# Patient Record
Sex: FEMALE | Race: WHITE | Employment: UNEMPLOYED | URBAN - METROPOLITAN AREA
[De-identification: names, ages, dates, MRNs, and addresses within clinical notes are randomized per-mention and may not be internally consistent; named-entity substitution may affect disease eponyms.]

---

## 2017-09-06 PROBLEM — M25.512 CHRONIC LEFT SHOULDER PAIN: Status: ACTIVE | Noted: 2017-09-06

## 2017-09-06 PROBLEM — F43.20 ADULT SITUATIONAL STRESS DISORDER: Status: ACTIVE | Noted: 2017-09-06

## 2017-09-06 PROBLEM — G89.29 CHRONIC LEFT SHOULDER PAIN: Status: ACTIVE | Noted: 2017-09-06

## 2018-04-18 ENCOUNTER — HOSPITAL ENCOUNTER (OUTPATIENT)
Dept: SURGERY | Age: 43
Discharge: HOME | End: 2018-04-18
Payer: OTHER GOVERNMENT

## 2018-04-18 VITALS
RESPIRATION RATE: 16 BRPM | OXYGEN SATURATION: 100 % | DIASTOLIC BLOOD PRESSURE: 58 MMHG | BODY MASS INDEX: 17.89 KG/M2 | SYSTOLIC BLOOD PRESSURE: 98 MMHG | HEART RATE: 77 BPM | TEMPERATURE: 97.9 F | WEIGHT: 101 LBS | HEIGHT: 63 IN

## 2018-04-18 LAB
ANION GAP SERPL CALC-SCNC: 5 MMOL/L (ref 7–16)
APPEARANCE UR: ABNORMAL
BACTERIA SPEC CULT: NORMAL
BACTERIA URNS QL MICRO: ABNORMAL /HPF
BASOPHILS # BLD: 0 K/UL (ref 0–0.2)
BASOPHILS NFR BLD: 0 % (ref 0–2)
BILIRUB UR QL: NEGATIVE
BUN SERPL-MCNC: 9 MG/DL (ref 6–23)
CALCIUM SERPL-MCNC: 8.5 MG/DL (ref 8.3–10.4)
CASTS URNS QL MICRO: ABNORMAL /LPF
CHLORIDE SERPL-SCNC: 108 MMOL/L (ref 98–107)
CO2 SERPL-SCNC: 29 MMOL/L (ref 21–32)
COLOR UR: YELLOW
CREAT SERPL-MCNC: 0.69 MG/DL (ref 0.6–1)
DIFFERENTIAL METHOD BLD: ABNORMAL
EOSINOPHIL # BLD: 0.1 K/UL (ref 0–0.8)
EOSINOPHIL NFR BLD: 1 % (ref 0.5–7.8)
EPI CELLS #/AREA URNS HPF: ABNORMAL /HPF
ERYTHROCYTE [DISTWIDTH] IN BLOOD BY AUTOMATED COUNT: 12.7 % (ref 11.9–14.6)
GLUCOSE SERPL-MCNC: 74 MG/DL (ref 65–100)
GLUCOSE UR STRIP.AUTO-MCNC: NEGATIVE MG/DL
HCT VFR BLD AUTO: 42.4 % (ref 35.8–46.3)
HGB BLD-MCNC: 14.6 G/DL (ref 11.7–15.4)
HGB UR QL STRIP: ABNORMAL
IMM GRANULOCYTES # BLD: 0 K/UL (ref 0–0.5)
IMM GRANULOCYTES NFR BLD AUTO: 0 % (ref 0–5)
KETONES UR QL STRIP.AUTO: NEGATIVE MG/DL
LEUKOCYTE ESTERASE UR QL STRIP.AUTO: NEGATIVE
LYMPHOCYTES # BLD: 1.4 K/UL (ref 0.5–4.6)
LYMPHOCYTES NFR BLD: 22 % (ref 13–44)
MCH RBC QN AUTO: 33.8 PG (ref 26.1–32.9)
MCHC RBC AUTO-ENTMCNC: 34.4 G/DL (ref 31.4–35)
MCV RBC AUTO: 98.1 FL (ref 79.6–97.8)
MONOCYTES # BLD: 0.4 K/UL (ref 0.1–1.3)
MONOCYTES NFR BLD: 6 % (ref 4–12)
NEUTS SEG # BLD: 4.7 K/UL (ref 1.7–8.2)
NEUTS SEG NFR BLD: 71 % (ref 43–78)
NITRITE UR QL STRIP.AUTO: NEGATIVE
PH UR STRIP: 6.5 [PH] (ref 5–9)
PLATELET # BLD AUTO: 236 K/UL (ref 150–450)
PMV BLD AUTO: 9.5 FL (ref 10.8–14.1)
POTASSIUM SERPL-SCNC: 4 MMOL/L (ref 3.5–5.1)
PROT UR STRIP-MCNC: NEGATIVE MG/DL
RBC # BLD AUTO: 4.32 M/UL (ref 4.05–5.25)
RBC #/AREA URNS HPF: ABNORMAL /HPF
SERVICE CMNT-IMP: NORMAL
SODIUM SERPL-SCNC: 142 MMOL/L (ref 136–145)
SP GR UR REFRACTOMETRY: 1.02 (ref 1–1.02)
UROBILINOGEN UR QL STRIP.AUTO: 0.2 EU/DL (ref 0.2–1)
WBC # BLD AUTO: 6.6 K/UL (ref 4.3–11.1)
WBC URNS QL MICRO: ABNORMAL /HPF

## 2018-04-18 PROCEDURE — 87641 MR-STAPH DNA AMP PROBE: CPT | Performed by: NEUROLOGICAL SURGERY

## 2018-04-18 PROCEDURE — 81001 URINALYSIS AUTO W/SCOPE: CPT | Performed by: NEUROLOGICAL SURGERY

## 2018-04-18 PROCEDURE — 85025 COMPLETE CBC W/AUTO DIFF WBC: CPT | Performed by: NEUROLOGICAL SURGERY

## 2018-04-18 PROCEDURE — 80048 BASIC METABOLIC PNL TOTAL CA: CPT | Performed by: NEUROLOGICAL SURGERY

## 2018-04-18 RX ORDER — BISMUTH SUBSALICYLATE 262 MG
1 TABLET,CHEWABLE ORAL DAILY
COMMUNITY
End: 2018-07-23

## 2018-04-18 RX ORDER — HYDROCORTISONE AND ACETIC ACID 20.75; 10.375 MG/ML; MG/ML
SOLUTION AURICULAR (OTIC) 2 TIMES DAILY
COMMUNITY

## 2018-04-18 RX ORDER — MAGNESIUM 30 MG
TABLET ORAL
COMMUNITY
End: 2018-07-23

## 2018-04-18 NOTE — PERIOP NOTES
Patient verified name, , and surgery as listed in [facility]. Patient provided medical/health information and PTA medications to the best of their ability. TYPE  CASE: 3  Orders per surgeon: yes received and dated yes  Labs per surgeon: mrsa swab,cbc,bmp,urine. Results: -  Labs per anesthesia protocol: type and screen-dos. Results =  EKG:  none     Nasal Swab collected per MD order and instructions for Mupirocin nasal ointment if required. Patient provided with and instructed on education handouts including Guide to Surgery, blood transfusions, pain management, and hand hygiene for the family and community, and [facility] brochure. Road to Recovery Spine surgery patient guide given. Instructed on incentive spirometry with return demonstration. Long handled prehab sponge given with instructions for use. Patient viewed spine prehab video. Hibiclens and instructions given per hospital policy. Instructed patient to continue previous medications as prescribed prior to surgery unless otherwise directed and to take the following medications the day of surgery according to anesthesia guidelines : hydrocodone as needed . Instructed patient to hold  the following medications on the day of surgery: multivitamin,magnesium,iodine. Original medication prescription bottles most visualized during patient appointment. Patient teach back successful and patient demonstrates knowledge of instruction.

## 2018-04-24 ENCOUNTER — ANESTHESIA EVENT (OUTPATIENT)
Dept: SURGERY | Age: 43
End: 2018-04-24
Payer: OTHER GOVERNMENT

## 2018-04-25 ENCOUNTER — ANESTHESIA (OUTPATIENT)
Dept: SURGERY | Age: 43
End: 2018-04-25
Payer: OTHER GOVERNMENT

## 2018-04-25 ENCOUNTER — HOSPITAL ENCOUNTER (OUTPATIENT)
Age: 43
Setting detail: OUTPATIENT SURGERY
Discharge: HOME | End: 2018-04-25
Attending: NEUROLOGICAL SURGERY | Admitting: NEUROLOGICAL SURGERY
Payer: OTHER GOVERNMENT

## 2018-04-25 ENCOUNTER — APPOINTMENT (OUTPATIENT)
Dept: GENERAL RADIOLOGY | Age: 43
End: 2018-04-25
Attending: NEUROLOGICAL SURGERY
Payer: OTHER GOVERNMENT

## 2018-04-25 VITALS
HEIGHT: 62 IN | SYSTOLIC BLOOD PRESSURE: 110 MMHG | HEART RATE: 71 BPM | TEMPERATURE: 98.5 F | WEIGHT: 99.5 LBS | OXYGEN SATURATION: 100 % | DIASTOLIC BLOOD PRESSURE: 60 MMHG | BODY MASS INDEX: 18.31 KG/M2 | RESPIRATION RATE: 20 BRPM

## 2018-04-25 DIAGNOSIS — M48.02 CERVICAL STENOSIS OF SPINAL CANAL: Primary | ICD-10-CM

## 2018-04-25 PROCEDURE — C1713 ANCHOR/SCREW BN/BN,TIS/BN: HCPCS | Performed by: NEUROLOGICAL SURGERY

## 2018-04-25 PROCEDURE — 74011250636 HC RX REV CODE- 250/636: Performed by: NEUROLOGICAL SURGERY

## 2018-04-25 PROCEDURE — 74011250637 HC RX REV CODE- 250/637: Performed by: ANESTHESIOLOGY

## 2018-04-25 PROCEDURE — 77030039266 HC ADH SKN EXOFIN S2SG -A: Performed by: NEUROLOGICAL SURGERY

## 2018-04-25 PROCEDURE — 77030028270 HC SRGFL HEMSTAT MTRX J&J -C: Performed by: NEUROLOGICAL SURGERY

## 2018-04-25 PROCEDURE — 76210000006 HC OR PH I REC 0.5 TO 1 HR: Performed by: NEUROLOGICAL SURGERY

## 2018-04-25 PROCEDURE — 74011000250 HC RX REV CODE- 250

## 2018-04-25 PROCEDURE — 77030030163 HC BN WAX J&J -A: Performed by: NEUROLOGICAL SURGERY

## 2018-04-25 PROCEDURE — 77030011267 HC ELECTRD BLD COVD -A: Performed by: NEUROLOGICAL SURGERY

## 2018-04-25 PROCEDURE — 77030003666 HC NDL SPINAL BD -A: Performed by: NEUROLOGICAL SURGERY

## 2018-04-25 PROCEDURE — 77030004391 HC BUR FLUT MEDT -C: Performed by: NEUROLOGICAL SURGERY

## 2018-04-25 PROCEDURE — 76060000035 HC ANESTHESIA 2 TO 2.5 HR: Performed by: NEUROLOGICAL SURGERY

## 2018-04-25 PROCEDURE — 77030026405 HC CGE ANT CERV ROIC ZIMM -H: Performed by: NEUROLOGICAL SURGERY

## 2018-04-25 PROCEDURE — 76010000171 HC OR TIME 2 TO 2.5 HR INTENSV-TIER 1: Performed by: NEUROLOGICAL SURGERY

## 2018-04-25 PROCEDURE — 72040 X-RAY EXAM NECK SPINE 2-3 VW: CPT

## 2018-04-25 PROCEDURE — 88304 TISSUE EXAM BY PATHOLOGIST: CPT | Performed by: NEUROLOGICAL SURGERY

## 2018-04-25 PROCEDURE — 77030009868 HC PIN DISTR CASPR AESC -B: Performed by: NEUROLOGICAL SURGERY

## 2018-04-25 PROCEDURE — 76210000020 HC REC RM PH II FIRST 0.5 HR: Performed by: NEUROLOGICAL SURGERY

## 2018-04-25 PROCEDURE — 77030008703 HC TU ET UNCUF COVD -A: Performed by: ANESTHESIOLOGY

## 2018-04-25 PROCEDURE — 77030018390 HC SPNG HEMSTAT2 J&J -B: Performed by: NEUROLOGICAL SURGERY

## 2018-04-25 PROCEDURE — 77030008477 HC STYL SATN SLP COVD -A: Performed by: ANESTHESIOLOGY

## 2018-04-25 PROCEDURE — 77030019908 HC STETH ESOPH SIMS -A: Performed by: ANESTHESIOLOGY

## 2018-04-25 PROCEDURE — 77030014650 HC SEAL MTRX FLOSEL BAXT -C: Performed by: NEUROLOGICAL SURGERY

## 2018-04-25 PROCEDURE — 74011250636 HC RX REV CODE- 250/636

## 2018-04-25 PROCEDURE — 77030003029 HC SUT VCRL J&J -B: Performed by: NEUROLOGICAL SURGERY

## 2018-04-25 PROCEDURE — 77030020782 HC GWN BAIR PAWS FLX 3M -B: Performed by: ANESTHESIOLOGY

## 2018-04-25 PROCEDURE — 74011000250 HC RX REV CODE- 250: Performed by: NEUROLOGICAL SURGERY

## 2018-04-25 PROCEDURE — 74011250636 HC RX REV CODE- 250/636: Performed by: ANESTHESIOLOGY

## 2018-04-25 PROCEDURE — 77030011640 HC PAD GRND REM COVD -A: Performed by: NEUROLOGICAL SURGERY

## 2018-04-25 PROCEDURE — 77030018836 HC SOL IRR NACL ICUM -A: Performed by: NEUROLOGICAL SURGERY

## 2018-04-25 PROCEDURE — 77030032490 HC SLV COMPR SCD KNE COVD -B: Performed by: NEUROLOGICAL SURGERY

## 2018-04-25 DEVICE — IMPLANTABLE DEVICE: Type: IMPLANTABLE DEVICE | Site: SPINE CERVICAL | Status: FUNCTIONAL

## 2018-04-25 DEVICE — PLATE SPNL H5-7MM 6DEG STD PEEK TI ANAT CRV FOR DISCECTOMY: Type: IMPLANTABLE DEVICE | Site: SPINE CERVICAL | Status: FUNCTIONAL

## 2018-04-25 DEVICE — GRAFT BNE SUB 25CC 2MM GRAN ALLOGENIC MORPHOGENETIC PROT W: Type: IMPLANTABLE DEVICE | Site: SPINE CERVICAL | Status: FUNCTIONAL

## 2018-04-25 RX ORDER — HYDROMORPHONE HYDROCHLORIDE 2 MG/ML
0.5 INJECTION, SOLUTION INTRAMUSCULAR; INTRAVENOUS; SUBCUTANEOUS
Status: DISCONTINUED | OUTPATIENT
Start: 2018-04-25 | End: 2018-04-25 | Stop reason: HOSPADM

## 2018-04-25 RX ORDER — LIDOCAINE HYDROCHLORIDE AND EPINEPHRINE 10; 10 MG/ML; UG/ML
INJECTION, SOLUTION INFILTRATION; PERINEURAL AS NEEDED
Status: DISCONTINUED | OUTPATIENT
Start: 2018-04-25 | End: 2018-04-25 | Stop reason: HOSPADM

## 2018-04-25 RX ORDER — DEXAMETHASONE SODIUM PHOSPHATE 100 MG/10ML
10 INJECTION INTRAMUSCULAR; INTRAVENOUS ONCE
Status: COMPLETED | OUTPATIENT
Start: 2018-04-25 | End: 2018-04-25

## 2018-04-25 RX ORDER — FAMOTIDINE 20 MG/1
20 TABLET, FILM COATED ORAL ONCE
Status: COMPLETED | OUTPATIENT
Start: 2018-04-25 | End: 2018-04-25

## 2018-04-25 RX ORDER — OXYCODONE HYDROCHLORIDE 5 MG/1
5 TABLET ORAL
Status: COMPLETED | OUTPATIENT
Start: 2018-04-25 | End: 2018-04-25

## 2018-04-25 RX ORDER — SODIUM CHLORIDE 0.9 % (FLUSH) 0.9 %
5-10 SYRINGE (ML) INJECTION AS NEEDED
Status: DISCONTINUED | OUTPATIENT
Start: 2018-04-25 | End: 2018-04-25 | Stop reason: HOSPADM

## 2018-04-25 RX ORDER — FENTANYL CITRATE 50 UG/ML
100 INJECTION, SOLUTION INTRAMUSCULAR; INTRAVENOUS ONCE
Status: DISCONTINUED | OUTPATIENT
Start: 2018-04-25 | End: 2018-04-25 | Stop reason: HOSPADM

## 2018-04-25 RX ORDER — ACETAMINOPHEN 500 MG
1000 TABLET ORAL
Status: DISCONTINUED | OUTPATIENT
Start: 2018-04-25 | End: 2018-04-25 | Stop reason: HOSPADM

## 2018-04-25 RX ORDER — SODIUM CHLORIDE, SODIUM LACTATE, POTASSIUM CHLORIDE, CALCIUM CHLORIDE 600; 310; 30; 20 MG/100ML; MG/100ML; MG/100ML; MG/100ML
150 INJECTION, SOLUTION INTRAVENOUS CONTINUOUS
Status: DISCONTINUED | OUTPATIENT
Start: 2018-04-25 | End: 2018-04-25 | Stop reason: HOSPADM

## 2018-04-25 RX ORDER — ONDANSETRON 2 MG/ML
INJECTION INTRAMUSCULAR; INTRAVENOUS AS NEEDED
Status: DISCONTINUED | OUTPATIENT
Start: 2018-04-25 | End: 2018-04-25 | Stop reason: HOSPADM

## 2018-04-25 RX ORDER — OXYCODONE AND ACETAMINOPHEN 7.5; 325 MG/1; MG/1
1 TABLET ORAL
Qty: 21 TAB | Refills: 0 | Status: SHIPPED | OUTPATIENT
Start: 2018-04-25 | End: 2018-06-26

## 2018-04-25 RX ORDER — ROCURONIUM BROMIDE 10 MG/ML
INJECTION, SOLUTION INTRAVENOUS AS NEEDED
Status: DISCONTINUED | OUTPATIENT
Start: 2018-04-25 | End: 2018-04-25 | Stop reason: HOSPADM

## 2018-04-25 RX ORDER — PROPOFOL 10 MG/ML
INJECTION, EMULSION INTRAVENOUS AS NEEDED
Status: DISCONTINUED | OUTPATIENT
Start: 2018-04-25 | End: 2018-04-25 | Stop reason: HOSPADM

## 2018-04-25 RX ORDER — SODIUM CHLORIDE 9 MG/ML
50 INJECTION, SOLUTION INTRAVENOUS CONTINUOUS
Status: DISCONTINUED | OUTPATIENT
Start: 2018-04-25 | End: 2018-04-25 | Stop reason: HOSPADM

## 2018-04-25 RX ORDER — FENTANYL CITRATE 50 UG/ML
INJECTION, SOLUTION INTRAMUSCULAR; INTRAVENOUS AS NEEDED
Status: DISCONTINUED | OUTPATIENT
Start: 2018-04-25 | End: 2018-04-25 | Stop reason: HOSPADM

## 2018-04-25 RX ORDER — NEOSTIGMINE METHYLSULFATE 1 MG/ML
INJECTION INTRAVENOUS AS NEEDED
Status: DISCONTINUED | OUTPATIENT
Start: 2018-04-25 | End: 2018-04-25 | Stop reason: HOSPADM

## 2018-04-25 RX ORDER — CEFAZOLIN SODIUM/WATER 2 G/20 ML
2 SYRINGE (ML) INTRAVENOUS
Status: COMPLETED | OUTPATIENT
Start: 2018-04-25 | End: 2018-04-25

## 2018-04-25 RX ORDER — HYDROCODONE BITARTRATE AND ACETAMINOPHEN 5; 325 MG/1; MG/1
1 TABLET ORAL AS NEEDED
Status: DISCONTINUED | OUTPATIENT
Start: 2018-04-25 | End: 2018-04-25 | Stop reason: HOSPADM

## 2018-04-25 RX ORDER — LIDOCAINE HYDROCHLORIDE 10 MG/ML
0.1 INJECTION INFILTRATION; PERINEURAL AS NEEDED
Status: DISCONTINUED | OUTPATIENT
Start: 2018-04-25 | End: 2018-04-25 | Stop reason: HOSPADM

## 2018-04-25 RX ORDER — SODIUM CHLORIDE 0.9 % (FLUSH) 0.9 %
5-10 SYRINGE (ML) INJECTION EVERY 8 HOURS
Status: DISCONTINUED | OUTPATIENT
Start: 2018-04-25 | End: 2018-04-25 | Stop reason: HOSPADM

## 2018-04-25 RX ORDER — LIDOCAINE HYDROCHLORIDE 20 MG/ML
INJECTION, SOLUTION EPIDURAL; INFILTRATION; INTRACAUDAL; PERINEURAL AS NEEDED
Status: DISCONTINUED | OUTPATIENT
Start: 2018-04-25 | End: 2018-04-25 | Stop reason: HOSPADM

## 2018-04-25 RX ORDER — GLYCOPYRROLATE 0.2 MG/ML
INJECTION INTRAMUSCULAR; INTRAVENOUS AS NEEDED
Status: DISCONTINUED | OUTPATIENT
Start: 2018-04-25 | End: 2018-04-25 | Stop reason: HOSPADM

## 2018-04-25 RX ORDER — MIDAZOLAM HYDROCHLORIDE 1 MG/ML
2 INJECTION, SOLUTION INTRAMUSCULAR; INTRAVENOUS
Status: DISCONTINUED | OUTPATIENT
Start: 2018-04-25 | End: 2018-04-25 | Stop reason: HOSPADM

## 2018-04-25 RX ADMIN — ROCURONIUM BROMIDE 5 MG: 10 INJECTION, SOLUTION INTRAVENOUS at 09:16

## 2018-04-25 RX ADMIN — HYDROMORPHONE HYDROCHLORIDE 0.5 MG: 2 INJECTION, SOLUTION INTRAMUSCULAR; INTRAVENOUS; SUBCUTANEOUS at 10:17

## 2018-04-25 RX ADMIN — LIDOCAINE HYDROCHLORIDE 100 MG: 20 INJECTION, SOLUTION EPIDURAL; INFILTRATION; INTRACAUDAL; PERINEURAL at 07:30

## 2018-04-25 RX ADMIN — FENTANYL CITRATE 100 MCG: 50 INJECTION, SOLUTION INTRAMUSCULAR; INTRAVENOUS at 07:30

## 2018-04-25 RX ADMIN — OXYCODONE HYDROCHLORIDE 5 MG: 5 TABLET ORAL at 10:21

## 2018-04-25 RX ADMIN — DEXAMETHASONE SODIUM PHOSPHATE 10 MG: 10 INJECTION INTRAMUSCULAR; INTRAVENOUS at 07:01

## 2018-04-25 RX ADMIN — SODIUM CHLORIDE, SODIUM LACTATE, POTASSIUM CHLORIDE, AND CALCIUM CHLORIDE 150 ML/HR: 600; 310; 30; 20 INJECTION, SOLUTION INTRAVENOUS at 06:50

## 2018-04-25 RX ADMIN — PROPOFOL 160 MG: 10 INJECTION, EMULSION INTRAVENOUS at 07:30

## 2018-04-25 RX ADMIN — ROCURONIUM BROMIDE 30 MG: 10 INJECTION, SOLUTION INTRAVENOUS at 07:30

## 2018-04-25 RX ADMIN — ROCURONIUM BROMIDE 10 MG: 10 INJECTION, SOLUTION INTRAVENOUS at 08:18

## 2018-04-25 RX ADMIN — NEOSTIGMINE METHYLSULFATE 2 MG: 1 INJECTION INTRAVENOUS at 09:36

## 2018-04-25 RX ADMIN — MIDAZOLAM HYDROCHLORIDE 2 MG: 1 INJECTION, SOLUTION INTRAMUSCULAR; INTRAVENOUS at 07:06

## 2018-04-25 RX ADMIN — FAMOTIDINE 20 MG: 20 TABLET ORAL at 07:00

## 2018-04-25 RX ADMIN — ONDANSETRON 4 MG: 2 INJECTION INTRAMUSCULAR; INTRAVENOUS at 09:17

## 2018-04-25 RX ADMIN — Medication 2 G: at 07:44

## 2018-04-25 RX ADMIN — GLYCOPYRROLATE 0.4 MG: 0.2 INJECTION INTRAMUSCULAR; INTRAVENOUS at 09:36

## 2018-04-25 NOTE — OP NOTES
[facility] REPORT    Sravani Singh  MR#: [id number]  : 1975  ACCOUNT #: [de-identified]   DATE OF SERVICE: 2018    PREOPERATIVE DIAGNOSIS:  Cervical stenosis with cord compression C5 through C7. POSTOPERATIVE DIAGNOSIS:  Cervical stenosis with cord compression C5 through C7. PROCEDURES PERFORMED:   1. Anterior cervical spine total diskectomy with decompression of the spinal cord and nerve roots, C5-C6 and C6-C7.  2.  Anterior cervical spine interbody fusion with LDR NIKKI cage OsteoAMP and bone marrow aspirate, C5-6 and C6-7.  3.  Anterior cervical spine instrumentation with LDR plating, C5 through C7.  4.  Bone marrow aspiration C6 vertebra. 5.  Continuous intraoperative fluoroscopy. SURGEON:  Roxy Carson MD     ASSISTANT:  None. ANESTHESIA:  General endotracheal.    ESTIMATED BLOOD LOSS:  Minimal.    PREPARATION:  ChloraPrep. COMPLICATIONS:  None. SPECIMENS REMOVED: C5-C6 and C6-C7 disk. IMPLANTS:  See below. HISTORY OF PRESENT ILLNESS:  A 42-year-old lady with significant upper extremity pain and weakness, refractory to conservative measures. MRI scan was positive for spinal stenosis at C5-6 and C6-7 with cord compression. The patient was admitted for surgery as conservative measures have failed. OPERATIVE NOTE:  The patient was brought to the operating room and was carefully placed under general endotracheal anesthesia without complication, was placed supine with a roll under the shoulder blades, the head gently extended on a Rodney pillow and the right side of the neck carefully prepped in usual sterile fashion. A linear incision was drawn out at the C6 vertebral level, confirmed by fluoroscopy. This was infiltrated with 1% Xylocaine with epinephrine, incised with a 15 blade and carried sharply through the platysma.   Using rostral to caudal subplatysmal dissection, a plane was created between the carotid artery laterally and trachea and esophagus medially. Handheld retractors were placed. The longus colli muscles were stripped bilaterally. Lateral C-arm fluoroscopy confirmed. An 18-gauge spinal needle was correctly inserted into the C5-6 disk space. The anterior spurs were removed with a Leksell rongeur at both C5-6 and C6-7. Both disk spaces were opened with a #15 blade and cleaned with pituitary rongeurs. The [place] distracting pin was placed in the C6 vertebra and using a 3 mL syringe and 16-gauge Angiocath, approximately 3 mL of bone marrow were aspirated and mixed with OsteoAMP on the back table. The hole was plugged with Nu-Knit, Surgicel and bone wax. Next, the [employer] Fredi AMA drill was used and the endplates at B7-7 and G0-1 were drilled sequentially down to the posterior ligament, which was opened with a sharp curet and 1 mm Kerrison rongeur and a ball-tip probe was passed along the nerve roots bilaterally without further compression. Greater stenosis was present at C5-6 than at C6-7. At this point, LDR lordotic 5 trials were placed in each disk space tightly with good endplate contact and distraction noted, confirmed by fluoroscopy. LDR 5 lordotic cages were then packed with OsteoAMP and bone marrow aspirate and countersunk to a nice tight fit, first at C5-6 and at C6-7 with a bone impactor and a mallet. Lateral C-arm fluoroscopy confirmed good position of the cage. LDR plates were engaged first inferiorly and then superiorly with 1 and 2 mm taps and a mallet, first at C5-6 and at C6-7, again confirmed by fluoroscopy to be in good position. The wound was irrigated with thrombin, and during this time AP and lateral x-ray were obtained, which confirmed good position of the graft and hardware. Additional thrombin was placed. No further bleeding was noted. FloSeal was placed. The wound was dry and it was closed. The platysma was closed tightly with interrupted 3-0 Vicryl.   Subcutaneous tissue was closed with 3-0 Vicryl. Skin was closed with Dermabond. The patient tolerated the procedure well, was awakened, extubated, and taken to PACU in stable condition. There were no obvious complications. DISCHARGE INSTRUCTIONS:  DIET:  Regular. ACTIVITY:  As tolerated. No heavy lifting, bending or automobile driving. Showers are permissible but no baths. The patient will contact physician if she develops any fever, drainage, swelling, cellulitis or neurological change. Return visit in 10-14 days for wound check. DISCHARGE  MEDICATIONS:  Included admission medicines plus Percocet 7.5/325 q.8 hours p.r.n. DISCHARGE CONDITION:  Satisfactory.       MD LAUREN Andersen / EDER  D: 04/25/2018 09:42     T: 04/25/2018 14:23  JOB #: [id number]

## 2018-04-25 NOTE — PERIOP NOTES
Patient's family identified by name and access code and given update with surgical start time. ARGELIA Ochoa @ [phone].

## 2018-04-25 NOTE — IP AVS SNAPSHOT
[address] [facility] [address] 
 
 
 [address] [facility] [address] [facility] 
[phone] Patient: Caridad Munoz MRN: [id number] About your hospitalization You were admitted on:  2018 You last received care in the:  [facility] SURGERY You were discharged on:  2018 Why you were hospitalized Your primary diagnosis was:  Not on File Follow-up Information Follow up With Details Comments Contact Info Ronnie Henriquez MD Go on 2018 Follow up at 11:30 am. [facility] [address] [facility] [address] 
[phone] Your Scheduled Appointments Wednesday May 09, 2018 11:30 AM EDT  
WOUND CHECK with [facility] NURSE  
[facility] ([facility]) [address] [address] [place] [address] [address]  
[phone] Wednesday May 30, 2018  5:15 PM EDT  
MIRIAM MAMMO SCREENING with SFE MIRIAM BI ROOM 2 [address] [facility] Breast [facility] ([address]) [address] [facility] [address]  
[phone] ***** NOTE: Appointments for the Mobile Mammography UNIT CANNOT be made on My Chart *****  PATIENT ARRIVAL - Please report 30 minutes early to check in. GENERAL INSTRUCTIONS -  On the day of your exam do not use any bath powder, deodorant or lotions on the chest or armpit area. Wear two-piece outfit for ease of changing. Allow at least 1 hour for test. -  If scheduled at [facility], please register on the 1st floor before going upstairs. [address] [facility]. 2nd floor [address] [facility] Discharge Orders None A check duke indicates which time of day the medication should be taken. My Medications START taking these medications  Instructions Each Dose to Equal  
 Morning Noon Evening Bedtime  
 oxyCODONE-acetaminophen 7.5-325 mg per tablet Commonly known as:  PERCOCET 7.5 Your last dose was: Your next dose is: Take 1 Tab by mouth every eight (8) hours as needed for Pain. Max Daily Amount: 3 Tabs. 1 Tab CONTINUE taking these medications Instructions Each Dose to Equal  
 Morning Noon Evening Bedtime  
 hydrocortisone-acetic acid otic solution Commonly known as:  VOSOL-HC Your last dose was: Your next dose is:    
   
   
 by Otic route two (2) times a day. HYSINGLA ER 20 mg ER tablet Generic drug:  HYDROcodone bitartrate Your last dose was: Your next dose is:    
   
   
 as needed  
     
   
   
   
  
 magnesium 30 mg Tab Your last dose was: Your next dose is: Take  by mouth.  
     
   
   
   
  
 multivitamin tablet Commonly known as:  ONE A DAY Your last dose was: Your next dose is: Take 1 Tab by mouth daily. 1 Tab NORCO  mg tablet Generic drug:  HYDROcodone-acetaminophen Your last dose was: Your next dose is: Take 1 Tab by mouth every six (6) hours as needed. 1 Tab OTHER Your last dose was: Your next dose is:    
   
   
 daily. Thyroid care Where to Get Your Medications Information on where to get these meds will be given to you by the nurse or doctor. ! Ask your nurse or doctor about these medications  
  oxyCODONE-acetaminophen 7.5-325 mg per tablet Opioid Education Prescription Opioids: What You Need to Know: 
 
 
- SHOWERING: You may shower the first day you are home with the dressing on. Do not soak in a tub for at least three weeks. If your dressing gets wet, change it according to the written instructions below. Use only soap and water on the incision and pat it dry. Do not scrub the incision. 
 
- WOUND CARE: A small to moderate amount of reddish drainage on the dressing is normal the first 1-2 days after surgery. If your dressing becomes soaked with drainage, let your doctor know. [facility] HANDS BEFORE AND AFTER INCISION CARE. 
 
- SIGNS OF INFECTION: Please notify your physician for any of the following: a temperature greater than 100.5, extreme tenderness at the wound, excessive redness and/or swelling, or large amounts of drainage from the wound. If you think you have a wound infection, call your physician's office immediately. 
 
- SWALLOWING: Don't be alarmed if it seems there is a lump in your throat for several days after surgery- this is normal. Eat only soft foods and drink plenty of fluids until your throat feels better. If you begin having trouble swallowing, call the office immediately. - DRIVING: You may not begin driving until after your visit to the physician's office for a wound and suture check. This is normally 7-10 days after you come home from the hospital. 
 
- MEDICATIONS: You may not take anti-inflammatory medications. These include over-the-counter ibuprofen products such as Motrin, Advil, Aleve and other related medications or prescription medications such as Ultram, Naproxen, Indocin, or Celebrex and others. These medications slow down the bone healing. You may take Tylenol. The pain medicine prescribed may be taken as needed.  You should continue taking a stool softener twice a day, drink plenty of water and eat high fiber foods to avoid constipation. (This is a common problem patients experience while taking pain medicine). - DEEP BREATHING EXERCISES: Continue to use your incentive spirometer for deep breathing exercises. - SMOKING: Smoking will interfere with your healing. If you smoke, you may end up having another surgery or more problems. 
 
- ACTIVITY: Avoid reaching overhead, particularly to lift things, until you have been told that your fusion has healed. Do not lift objects heavier than a coffee cup or a newspaper. You shouldn't lift anything heavier than 2-5 pounds. When lifting, you should bend with your knees and keep your back straight. Sleeping may be difficult and sometimes requires you to change positions frequently; try to sleep with your head elevated 15-30 degrees. You may turn your head  Gradually from side to side, but avoid placing your chin to your chest or flexing your head backwards. You may remove your CINTHYA hose when consistently walking. You may do steps and inclines in moderation. 
 
- SEXUAL RELATIONS: You may resume sexual relations 2 weeks after your surgery. - WALKING PROGRAM: After your sutures are removed or dissolved, it is very important that you begin a progressive walking program. Walking strengthens the spinal muscles and will help protect your disc and vertebrae. You will need to increase your walking program up to two miles per day over the next four weeks. You should begin slowly with 1/8 to 1/4 of a mile and add to this each day. Please be very consistent with your walking program, as this is a vital part of your recovery. 
 
- SYMPTOMS AFTER SURGERY: Don't be alarmed if you still have some of the same symptoms you had prior to surgery. The nerves often require time to heal after the pressure has been relieved. You may also experience pain between your shoulder blades which is common after this surgery.  The level of pain you experience should improve as your body heals. - FOLLOW-UP: An appointment will be made for you to follow-up with your surgeon or the office nurse. Please bring any X-rays of your neck to the appointmet. Please call our office if you have any other questions or problems. Listen to your body; it will tell you if you are overdoing it. Use common sense and take care of yourself! After general anesthesia or intravenous sedation, for 24 hours or while taking prescription Narcotics: · Limit your activities · Do not drive and operate hazardous machinery · Do not make important personal or business decisions · Do  not drink alcoholic beverages · If you have not urinated within 8 hours after discharge, please contact your surgeon on call. *  Please give a list of your current medications to your Primary Care Provider. *  Please update this list whenever your medications are discontinued, doses are 
    changed, or new medications (including over-the-counter products) are added. *  Please carry medication information at all times in case of emergency situations. These are general instructions for a healthy lifestyle: No smoking/ No tobacco products/ Avoid exposure to second hand smoke Surgeon General's Warning:  Quitting smoking now greatly reduces serious risk to your health. Obesity, smoking, and sedentary lifestyle greatly increases your risk for illness Recognize signs and symptoms of STROKE: 
 
F-face looks uneven A-arms unable to move or move unevenly S-speech slurred or non-existent T-time-call 911 as soon as signs and symptoms begin-DO NOT go Back to bed or wait to see if you get better-TIME IS BRAIN. Introducing [facility]! Natividad Morrison introduces [employer] patient portal. Now you can access parts of your medical record, email your doctor's office, and request medication refills online.    
 
1. In your internet browser, go to [email or web address] 2. Click on the First Time User? Click Here link in the Sign In box. You will see the New Member Sign Up page. 3. Enter your Dropmysite Access Code exactly as it appears below. You will not need to use this code after youve completed the sign-up process. If you do not sign up before the expiration date, you must request a new code. · Dropmysite Access Code: [email or web address] Expires: 7/16/2018  2:41 PM 
 
4. Enter the last four digits of your Social Security Number (xxxx) and Date of Birth (mm/dd/yyyy) as indicated and click Submit. You will be taken to the next sign-up page. 5. Create a RetiDiagt ID. This will be your Dropmysite login ID and cannot be changed, so think of one that is secure and easy to remember. 6. Create a Dropmysite password. You can change your password at any time. 7. Enter your Password Reset Question and Answer. This can be used at a later time if you forget your password. 8. Enter your e-mail address. You will receive e-mail notification when new information is available in [address] 9. Click Sign Up. You can now view and download portions of your medical record. 10. Click the Download Summary menu link to download a portable copy of your medical information. If you have questions, please visit the Frequently Asked Questions section of the Dropmysite website. Remember, Dropmysite is NOT to be used for urgent needs. For medical emergencies, dial 911. Now available from your iPhone and Android! Introducing Omid Snider As a [employer] patient, I wanted to make you aware of our electronic visit tool called Omid Snider. [employer] 24/7 allows you to connect within minutes with a medical provider 24 hours a day, seven days a week via a mobile device or tablet or logging into a secure website from your computer. You can access Omid Snider from anywhere in the [place]. A virtual visit might be right for you when you have a simple condition and feel like you just dont want to get out of bed, or cant get away from work for an appointment, when your regular Maryan Rmoeo provider is not available (evenings, weekends or holidays), or when youre out of town and need minor care. Electronic visits cost only $49 and if the Maryan Romeo 24/7 provider determines a prescription is needed to treat your condition, one can be electronically transmitted to a nearby pharmacy*. Please take a moment to enroll today if you have not already done so. The enrollment process is free and takes just a few minutes. To enroll, please download the [employer] 24/7 darek to your tablet or phone, or visit [email or web address] to enroll on your computer. And, as an [address] [place] [address] patient with a [employer] Now account, the results of your visits will be scanned into your electronic medical record and your primary care provider will be able to view the scanned results. We urge you to continue to see your regular Maryan Romeo provider for your ongoing medical care. And while your primary care provider may not be the one available when you seek a Omid Bartshelly virtual visit, the peace of mind you get from getting a real diagnosis real time can be priceless. For more information on Omid Blancfin, view our Frequently Asked Questions (FAQs) at [email or web address]. Sincerely, 
 
Elle Espinoza MD 
Chief Medical Officer Hernán Violette Dolan *:  certain medications cannot be prescribed via Omid Deitek SystemssalomoniSOCO Unresulted Labs-Please follow up with your PCP about these lab tests Order Current Status NC XR TECHNOLOGIST SERVICE In process XR SPINE CERV PA LAT ODONT 3 V MAX In process Providers Seen During Your Hospitalization Provider Specialty Primary office phone Eleni Melton MD Neurosurgery [phone] Your Primary Care Physician (PCP) Primary Care Physician Office Phone Office Fax Mike Kiran [phone] You are allergic to the following Allergen Reactions Ibuprofen Other (comments)  
 hypotension Recent Documentation Height Weight BMI OB Status Smoking Status 1.575 m 45.1 kg 18.2 kg/m2 Hysterectomy Current Every Day Smoker Emergency Contacts Name Discharge Info Relation Home Work Mobile [address] CAREGIVER [3] Friend [5] [phone] Patient Belongings The following personal items are in your possession at time of discharge: 
  Dental Appliances: None  Visual Aid: Glasses          Jewelry: None  Clothing: Footwear, Pants, Shirt, Undergarments    Other Valuables: Eyeglasses Please provide this summary of care documentation to your next provider. Signatures-by signing, you are acknowledging that this After Visit Summary has been reviewed with you and you have received a copy. Patient Signature:  ____________________________________________________________ Date:  ____________________________________________________________  
  
Julio Connor Provider Signature:  ____________________________________________________________ Date:  ____________________________________________________________

## 2018-04-25 NOTE — ANESTHESIA POSTPROCEDURE EVALUATION
Post-Anesthesia Evaluation and Assessment    Patient: Reather Seip MRN: [id number]  SSN: [id number]    YOB: 1975  Age: 37 y.o. Sex: female       Cardiovascular Function/Vital Signs  Visit Vitals    /60    Pulse 71    Temp 36.9 °C (98.5 °F)    Resp 20    Ht 5' 2\" (1.575 m)    Wt 45.1 kg (99 lb 8 oz)    SpO2 100%    BMI 18.2 kg/m2       Patient is status post general anesthesia for Procedure(s):  ACDF C5-7. Nausea/Vomiting: None    Postoperative hydration reviewed and adequate. Pain:  Pain Scale 1: Numeric (0 - 10) (04/25/18 1043)  Pain Intensity 1: 3 (04/25/18 1043)   Managed    Neurological Status:   Neuro (WDL): Within Defined Limits (04/25/18 1043)  Neuro  LUE Motor Response: Purposeful (04/25/18 1043)  LLE Motor Response: Purposeful (04/25/18 1043)  RUE Motor Response: Purposeful (04/25/18 1043)  RLE Motor Response: Purposeful (04/25/18 1043)   At baseline    Mental Status and Level of Consciousness: Arousable    Pulmonary Status:   O2 Device: Room air (04/25/18 1043)   Adequate oxygenation and airway patent    Complications related to anesthesia: None    Post-anesthesia assessment completed.  No concerns    Signed By: Mark Anthony Ruiz MD     April 25, 2018

## 2018-04-25 NOTE — ANESTHESIA PREPROCEDURE EVALUATION
Anesthetic History   No history of anesthetic complications            Review of Systems / Medical History  Patient summary reviewed, nursing notes reviewed and pertinent labs reviewed    Pulmonary    COPD: mild      Smoker         Neuro/Psych             Comments: Left arm radicular pain.  Cardiovascular  Within defined limits                Exercise tolerance: >4 METS     GI/Hepatic/Renal     GERD: well controlled           Endo/Other        Arthritis     Other Findings              Physical Exam    Airway  Mallampati: II  TM Distance: < 4 cm  Neck ROM: normal range of motion   Mouth opening: Normal     Cardiovascular  Regular rate and rhythm,  S1 and S2 normal,  no murmur, click, rub, or gallop  Rhythm: regular  Rate: normal         Dental    Dentition: Caps/crowns     Pulmonary  Breath sounds clear to auscultation               Abdominal         Other Findings            Anesthetic Plan    ASA: 2  Anesthesia type: general          Induction: Intravenous  Anesthetic plan and risks discussed with: Patient

## 2018-04-25 NOTE — DISCHARGE INSTRUCTIONS
[facility]  [address], [address], [address] [facility]  [phone]    Cervical (Neck) Fusion Postoperative Home Instructions    - SHOWERING: You may shower the first day you are home with the dressing on. Do not soak in a tub for at least three weeks. If your dressing gets wet, change it according to the written instructions below. Use only soap and water on the incision and pat it dry. Do not scrub the incision.    - WOUND CARE: A small to moderate amount of reddish drainage on the dressing is normal the first 1-2 days after surgery. If your dressing becomes soaked with drainage, let your doctor know. [facility] HANDS BEFORE AND AFTER INCISION CARE.    - SIGNS OF INFECTION: Please notify your physician for any of the following: a temperature greater than 100.5, extreme tenderness at the wound, excessive redness and/or swelling, or large amounts of drainage from the wound. If you think you have a wound infection, call your physician's office immediately.    - SWALLOWING: Don't be alarmed if it seems there is a lump in your throat for several days after surgery- this is normal. Eat only soft foods and drink plenty of fluids until your throat feels better. If you begin having trouble swallowing, call the office immediately. - DRIVING: You may not begin driving until after your visit to the physician's office for a wound and suture check. This is normally 7-10 days after you come home from the hospital.    - MEDICATIONS: You may not take anti-inflammatory medications. These include over-the-counter ibuprofen products such as Motrin, Advil, Aleve and other related medications or prescription medications such as Ultram, Naproxen, Indocin, or Celebrex and others. These medications slow down the bone healing. You may take Tylenol. The pain medicine prescribed may be taken as needed.  You should continue taking a stool softener twice a day, drink plenty of water and eat high fiber foods to avoid constipation. (This is a common problem patients experience while taking pain medicine). - DEEP BREATHING EXERCISES: Continue to use your incentive spirometer for deep breathing exercises. - SMOKING: Smoking will interfere with your healing. If you smoke, you may end up having another surgery or more problems.    - ACTIVITY: Avoid reaching overhead, particularly to lift things, until you have been told that your fusion has healed. Do not lift objects heavier than a coffee cup or a newspaper. You shouldn't lift anything heavier than 2-5 pounds. When lifting, you should bend with your knees and keep your back straight. Sleeping may be difficult and sometimes requires you to change positions frequently; try to sleep with your head elevated 15-30 degrees. You may turn your head  Gradually from side to side, but avoid placing your chin to your chest or flexing your head backwards. You may remove your CINTHYA hose when consistently walking. You may do steps and inclines in moderation.    - SEXUAL RELATIONS: You may resume sexual relations 2 weeks after your surgery. - WALKING PROGRAM: After your sutures are removed or dissolved, it is very important that you begin a progressive walking program. Walking strengthens the spinal muscles and will help protect your disc and vertebrae. You will need to increase your walking program up to two miles per day over the next four weeks. You should begin slowly with 1/8 to 1/4 of a mile and add to this each day. Please be very consistent with your walking program, as this is a vital part of your recovery.    - SYMPTOMS AFTER SURGERY: Don't be alarmed if you still have some of the same symptoms you had prior to surgery. The nerves often require time to heal after the pressure has been relieved. You may also experience pain between your shoulder blades which is common after this surgery. The level of pain you experience should improve as your body heals.     - FOLLOW-UP: An appointment will be made for you to follow-up with your surgeon or the office nurse. Please bring any X-rays of your neck to the appointmet. Please call our office if you have any other questions or problems. Listen to your body; it will tell you if you are overdoing it. Use common sense and take care of yourself! After general anesthesia or intravenous sedation, for 24 hours or while taking prescription Narcotics:  · Limit your activities  · Do not drive and operate hazardous machinery  · Do not make important personal or business decisions  · Do  not drink alcoholic beverages  · If you have not urinated within 8 hours after discharge, please contact your surgeon on call. *  Please give a list of your current medications to your Primary Care Provider. *  Please update this list whenever your medications are discontinued, doses are      changed, or new medications (including over-the-counter products) are added. *  Please carry medication information at all times in case of emergency situations. These are general instructions for a healthy lifestyle:  No smoking/ No tobacco products/ Avoid exposure to second hand smoke  Surgeon General's Warning:  Quitting smoking now greatly reduces serious risk to your health. Obesity, smoking, and sedentary lifestyle greatly increases your risk for illness    Recognize signs and symptoms of STROKE:    F-face looks uneven  A-arms unable to move or move unevenly  S-speech slurred or non-existent  T-time-call 911 as soon as signs and symptoms begin-DO NOT go       Back to bed or wait to see if you get better-TIME IS BRAIN.

## 2018-04-25 NOTE — BRIEF OP NOTE
BRIEF OPERATIVE NOTE    Date of Procedure: 4/25/2018   Preoperative Diagnosis: Cervical spinal stenosis [M48.02]  Postoperative Diagnosis: Cervical spinal stenosis [M48.02]    Procedure(s):  ACDF C5-7  Surgeon(s) and Role:     * Carlos A Corrigan MD - Primary         Surgical Assistant: NONE    Surgical Staff:  Circ-1: Yunior Thompson RN  Circ-Relief: Lamonte Mills RN  Radiology Technician: Phill Canavan, RT, R, CT  Scrub Tech-1: Aletha Corrales  Scrub Tech-2: Eduin Flannery  Nurse Practitioner: Edson Andrew NP  Event Time In   Incision Start 1067   Incision Close      Anesthesia: General   Estimated Blood Loss: MINIMAL  Specimens:   ID Type Source Tests Collected by Time Destination   1 : disc material C 5-7 Preservative Spinal  Carlos A Corrigan MD 4/25/2018 4314 Pathology      Findings: STENOSIS  Complications: NONE  Implants:   Implant Name Type Inv.  Item Serial No.  Lot No. LRB No. Used Action   GRAFT BNE GRAN 2.5ML -- OSTEOAMP - [id number]  GRAFT BNE GRAN 2.5ML -- OSTEOAMP [id number]--[id number] BIODwllrUS 71lbs  N/A 1 Implanted   CAGE CERV LORDTC [id number] -- NIKKI-C - [id number]  CAGE CERV LORDTC [id number] -- NIKKI-C  JOLLY SPINE [id number] N/A 1 Implanted   PLATE [facility] STD --  - [id number]  PLATE [facility] STD --   [facility] SPINE [id number] N/A 2 Implanted   CAGE CERV LORDTC [id number] -- NIKKI-C - [id number]   CAGE CERV LORDTC [id number] -- NIKKI-C   JOLLY SPINE [id number] N/A 1 Implanted

## 2018-06-07 ENCOUNTER — HOSPITAL ENCOUNTER (OUTPATIENT)
Dept: GENERAL RADIOLOGY | Age: 43
Discharge: HOME | End: 2018-06-07
Payer: OTHER GOVERNMENT

## 2018-06-07 DIAGNOSIS — M47.22 CERVICAL SPONDYLOSIS WITH RADICULOPATHY: ICD-10-CM

## 2018-06-07 PROCEDURE — 72040 X-RAY EXAM NECK SPINE 2-3 VW: CPT

## 2018-06-15 ENCOUNTER — HOSPITAL ENCOUNTER (OUTPATIENT)
Dept: MAMMOGRAPHY | Age: 43
Discharge: HOME | End: 2018-06-15
Attending: NURSE PRACTITIONER
Payer: OTHER GOVERNMENT

## 2018-06-15 DIAGNOSIS — Z12.31 VISIT FOR SCREENING MAMMOGRAM: ICD-10-CM

## 2018-06-15 PROCEDURE — 77067 SCR MAMMO BI INCL CAD: CPT

## 2018-06-21 ENCOUNTER — HOSPITAL ENCOUNTER (OUTPATIENT)
Dept: MAMMOGRAPHY | Age: 43
Discharge: HOME | End: 2018-06-21
Attending: NURSE PRACTITIONER
Payer: OTHER GOVERNMENT

## 2018-06-21 DIAGNOSIS — R92.8 ABNORMAL FINDING ON MAMMOGRAPHY: ICD-10-CM

## 2018-06-21 PROCEDURE — 76642 ULTRASOUND BREAST LIMITED: CPT

## 2018-06-21 PROCEDURE — 77066 DX MAMMO INCL CAD BI: CPT

## 2018-06-22 NOTE — PROGRESS NOTES
Mammogram left breast asymmetry appears benign cyst needs repeat left breast diagnostic mammogram in 6 months ANDREIA Rodas

## 2018-09-06 ENCOUNTER — HOSPITAL ENCOUNTER (OUTPATIENT)
Dept: GENERAL RADIOLOGY | Age: 43
Discharge: HOME | End: 2018-09-06
Payer: OTHER GOVERNMENT

## 2018-09-06 DIAGNOSIS — M48.02 CERVICAL STENOSIS OF SPINAL CANAL: ICD-10-CM

## 2018-09-06 PROCEDURE — 72040 X-RAY EXAM NECK SPINE 2-3 VW: CPT

## (undated) DEVICE — AGENT HEMSTAT W3XL4IN OXIDIZED REGENERATED CELOS ABSRB FOR

## (undated) DEVICE — APPLICATOR BNDG 1MM ADH PREMIERPRO EXOFIN

## (undated) DEVICE — STANDARD HYPODERMIC NEEDLE,POLYPROPYLENE HUB: Brand: MONOJECT

## (undated) DEVICE — BLADE ASSEMB CLP HAIR FINE --

## (undated) DEVICE — SYR 3ML LL TIP 1/10ML GRAD --

## (undated) DEVICE — CATHETER IV NDL L1.25IN 16GA GRY POLYUR WNG HUB DISP FOR

## (undated) DEVICE — Z CONVERTED USE 2421973 CONTAINER SPEC 60/30ML 10% FRMLN POLYPR PREFIL

## (undated) DEVICE — AGENT HEMSTAT 8ML FLX TIP MTRX + DISP SURGIFLO

## (undated) DEVICE — 2000CC GUARDIAN II: Brand: GUARDIAN

## (undated) DEVICE — CASPAR DISTR PIN12MMSTER: Brand: AESCULAP

## (undated) DEVICE — KARLIN BLADE, ULTRA KNIFE, SATIN, STAINLESS STEEL, 1.5 MM TIP, 4 IN, SINGLE USE, (10/PK): Brand: SYMMETRY SURGICAL

## (undated) DEVICE — NDL SPNE QNCKE 18GX3.5IN LF --

## (undated) DEVICE — SOLUTION IV 1000ML 0.9% SOD CHL

## (undated) DEVICE — (D)PREP SKN CHLRAPRP APPL 26ML -- CONVERT TO ITEM 371833

## (undated) DEVICE — REM POLYHESIVE ADULT PATIENT RETURN ELECTRODE: Brand: VALLEYLAB

## (undated) DEVICE — SUTURE VCRL + SZ 3-0 L18IN ABSRB UD SH 1/2 CIR TAPERCUT NDL VCP864D

## (undated) DEVICE — MEDI-VAC NON-CONDUCTIVE SUCTION TUBING: Brand: CARDINAL HEALTH

## (undated) DEVICE — SYR 10ML LUER LOK 1/5ML GRAD --

## (undated) DEVICE — KENDALL SCD EXPRESS SLEEVES, KNEE LENGTH, MEDIUM: Brand: KENDALL SCD

## (undated) DEVICE — WAX SURG 2.5GM HEMSTAT BNE BEESWAX PARAFFIN ISO PALMITATE

## (undated) DEVICE — 3M™ TEGADERM™ TRANSPARENT FILM DRESSING FRAME STYLE, 1626W, 4 IN X 4-3/4 IN (10 CM X 12 CM), 50/CT 4CT/CASE: Brand: 3M™ TEGADERM™

## (undated) DEVICE — FLOSEAL HEMOSTATIC MATRIX, 5 ML: Brand: FLOSEAL

## (undated) DEVICE — GOWN,REINF,POLY,ECL,PP SLV,XL: Brand: MEDLINE

## (undated) DEVICE — INSULATED BLADE ELECTRODE: Brand: EDGE

## (undated) DEVICE — PACK PROCEDURE SURG POST LAMINECTOMY CDS

## (undated) DEVICE — INTENDED FOR TISSUE SEPARATION, AND OTHER PROCEDURES THAT REQUIRE A SHARP SURGICAL BLADE TO PUNCTURE OR CUT.: Brand: BARD-PARKER SAFETY BLADES SIZE 15, STERILE

## (undated) DEVICE — GOWN,PREVENTION PLUS,2XL,ST,22/CS: Brand: MEDLINE

## (undated) DEVICE — DRAPE XR C ARM 41X74IN LF --

## (undated) DEVICE — TOOL 14MH30 LEGEND 14CM 3MM: Brand: MIDAS REX ™